# Patient Record
Sex: FEMALE | Race: BLACK OR AFRICAN AMERICAN | NOT HISPANIC OR LATINO | Employment: PART TIME | URBAN - METROPOLITAN AREA
[De-identification: names, ages, dates, MRNs, and addresses within clinical notes are randomized per-mention and may not be internally consistent; named-entity substitution may affect disease eponyms.]

---

## 2019-05-17 PROBLEM — F41.0 ANXIETY DISORDER DUE TO GENERAL MEDICAL CONDITION WITH PANIC ATTACK: Status: ACTIVE | Noted: 2019-05-17

## 2019-05-17 PROBLEM — F06.4 ANXIETY DISORDER DUE TO GENERAL MEDICAL CONDITION WITH PANIC ATTACK: Status: ACTIVE | Noted: 2019-05-17

## 2019-05-17 PROBLEM — F32.A DEPRESSION: Status: ACTIVE | Noted: 2019-05-17

## 2020-06-23 PROBLEM — R73.03 PREDIABETES: Status: ACTIVE | Noted: 2020-06-23

## 2020-06-23 PROBLEM — E78.5 OTHER AND UNSPECIFIED HYPERLIPIDEMIA: Status: ACTIVE | Noted: 2020-06-23

## 2022-05-07 PROBLEM — B97.7 HPV IN FEMALE: Status: ACTIVE | Noted: 2022-05-07

## 2022-05-12 PROBLEM — Z98.890 STATUS POST COLPOSCOPY: Status: ACTIVE | Noted: 2022-05-12

## 2022-06-06 PROBLEM — N87.1 MODERATE CERVICAL DYSPLASIA: Status: ACTIVE | Noted: 2022-06-06

## 2022-08-03 PROBLEM — D06.0 CARCINOMA IN SITU OF ENDOCERVIX: Status: ACTIVE | Noted: 2022-08-03

## 2023-02-13 PROBLEM — R87.613 HIGH GRADE SQUAMOUS INTRAEPITHELIAL LESION OF CERVIX: Status: ACTIVE | Noted: 2023-02-13

## 2023-05-25 PROBLEM — E11.9 TYPE 2 DIABETES MELLITUS WITHOUT COMPLICATION, WITHOUT LONG-TERM CURRENT USE OF INSULIN: Status: ACTIVE | Noted: 2023-05-25

## 2023-06-12 PROBLEM — Z98.890 S/P CONIZATION OF CERVIX: Status: ACTIVE | Noted: 2023-06-12

## 2023-11-06 PROBLEM — D06.9 CIN III (CERVICAL INTRAEPITHELIAL NEOPLASIA GRADE III) WITH SEVERE DYSPLASIA: Status: ACTIVE | Noted: 2023-11-06

## 2023-11-17 PROBLEM — Z98.890 POST-OPERATIVE STATE: Status: ACTIVE | Noted: 2023-11-17

## 2023-11-17 PROBLEM — Z90.710 STATUS POST LAPAROSCOPIC HYSTERECTOMY: Status: ACTIVE | Noted: 2023-11-17

## 2024-02-28 ENCOUNTER — SOCIAL WORK (OUTPATIENT)
Dept: ADMINISTRATIVE | Facility: OTHER | Age: 44
End: 2024-02-28

## 2024-02-28 NOTE — PROGRESS NOTES
Pt has started a new enrollment on Ozempic through PAP. Pt signature is needed on the  PAP application and a copy of pt insurance cards are needed. Pt has agreed to do so and  meet with CW on 03/01/2024.       [employer]    [phone]

## 2024-03-04 ENCOUNTER — SOCIAL WORK (OUTPATIENT)
Dept: ADMINISTRATIVE | Facility: OTHER | Age: 44
End: 2024-03-04

## 2024-03-04 NOTE — PROGRESS NOTES
The requested documents has been received, the pap application has been submitted. A decision will be made in 5 to 7days.         [facility]  [phone] ph

## 2024-03-11 ENCOUNTER — SOCIAL WORK (OUTPATIENT)
Dept: ADMINISTRATIVE | Facility: OTHER | Age: 44
End: 2024-03-11

## 2024-03-11 NOTE — PROGRESS NOTES
Pt has been approved to receive  Ozempic medication through 04/6/2025 as long as pt continue to meet program eligibility guidelines. The medication will be delivered in 10 to 14 days. Pt has been notified of outcome. Pt ID [id number].       [employer]    [phone]   [phone] Fax

## 2024-10-01 ENCOUNTER — PATIENT OUTREACH (OUTPATIENT)
Dept: ADMINISTRATIVE | Facility: HOSPITAL | Age: 44
End: 2024-10-01

## 2024-10-01 DIAGNOSIS — E11.9 TYPE 2 DIABETES MELLITUS WITHOUT COMPLICATION, WITHOUT LONG-TERM CURRENT USE OF INSULIN: Primary | ICD-10-CM

## 2024-10-03 ENCOUNTER — SOCIAL WORK (OUTPATIENT)
Dept: ADMINISTRATIVE | Facility: OTHER | Age: 44
End: 2024-10-03

## 2024-10-03 PROBLEM — Z23 IMMUNIZATION DUE: Status: ACTIVE | Noted: 2024-10-03

## 2024-10-03 NOTE — PROGRESS NOTES
Pt called regarding a dose change on Ozempic through PAP. The MD will have to complete a Reorder Form and provide a script. Once the Reorder Form is completed , the form will be submitted for review. Will continue to keep pt updated on enrollment.       [employer]    [phone]   [phone] Fax

## 2024-10-08 ENCOUNTER — SOCIAL WORK (OUTPATIENT)
Dept: ADMINISTRATIVE | Facility: OTHER | Age: 44
End: 2024-10-08

## 2024-10-08 NOTE — PROGRESS NOTES
A Reorder form and a script on Ozempic has been received, for a dose change. The form and script has been submitted to [facility]. Once [facility] receives the paperwork, the medication will be delivered in 10 to 14 days.        [employer]    [phone]   [phone] Fax

## 2024-10-11 ENCOUNTER — SOCIAL WORK (OUTPATIENT)
Dept: ADMINISTRATIVE | Facility: OTHER | Age: 44
End: 2024-10-11

## 2024-10-11 NOTE — PROGRESS NOTES
Pt has Ozempic at the [facility]. The pharmacy needs a script in order for pt too  her medication. In basket MD regarding this matter. Pt has medication at the present time. Pt new dose of medication will be delivered in 10 to 14 days.       [employer]    [phone]   [phone] Fax

## 2024-12-13 PROBLEM — L65.9 HAIR LOSS: Status: ACTIVE | Noted: 2024-12-13

## 2024-12-13 PROBLEM — L02.92 BOIL: Status: ACTIVE | Noted: 2024-12-13

## 2024-12-16 PROBLEM — N76.0 BV (BACTERIAL VAGINOSIS): Status: ACTIVE | Noted: 2024-12-16

## 2024-12-16 PROBLEM — B96.89 BV (BACTERIAL VAGINOSIS): Status: ACTIVE | Noted: 2024-12-16

## 2024-12-16 PROBLEM — B37.31 CANDIDA VAGINITIS: Status: ACTIVE | Noted: 2024-12-16

## 2025-02-19 ENCOUNTER — OUTPATIENT CASE MANAGEMENT (OUTPATIENT)
Dept: ADMINISTRATIVE | Facility: OTHER | Age: 45
End: 2025-02-19

## 2025-02-19 NOTE — PROGRESS NOTES
PAP Follow Up. Pt called requesting a status update on Ozempic through PAP. Called [facility]. According to the representative, there is a 15 day delay on medication deliver. Pt stated, she is on her last pen of medication. Pt was immediately issued a 30 day supply voucher, from [id number] .Pt may take the voucher to the pharmacy of choice and have the medication filled,free of charge. Pt medication will be delivered soon. Pt enrollment ends 4/6/2025. After this date pt may re-apply for PAP. Pt has been updated on enrollment.       [employer] CPhT, [facility] [place]    [phone]  [phone] Fax

## 2025-03-18 ENCOUNTER — PATIENT OUTREACH (OUTPATIENT)
Dept: ADMINISTRATIVE | Facility: HOSPITAL | Age: 45
End: 2025-03-18

## 2025-03-19 ENCOUNTER — OUTPATIENT CASE MANAGEMENT (OUTPATIENT)
Dept: ADMINISTRATIVE | Facility: OTHER | Age: 45
End: 2025-03-19

## 2025-03-19 PROBLEM — E78.00 PURE HYPERCHOLESTEROLEMIA: Status: ACTIVE | Noted: 2025-03-19

## 2025-03-19 NOTE — PROGRESS NOTES
PAP Follow Up. Pt has re--enrolled for Ozempic through PAP. A doctor signature is needed on the application. Once a doctor signature is obtain, the PAP application will be submitted.  MD will be in clinic 3/20/2025.      [employer] CPhT, [facility] [place]    [phone]   [phone] Fax   .

## 2025-08-21 ENCOUNTER — OUTPATIENT CASE MANAGEMENT (OUTPATIENT)
Dept: ADMINISTRATIVE | Facility: OTHER | Age: 45
End: 2025-08-21